# Patient Record
Sex: MALE | Race: BLACK OR AFRICAN AMERICAN | NOT HISPANIC OR LATINO | Employment: OTHER | URBAN - METROPOLITAN AREA
[De-identification: names, ages, dates, MRNs, and addresses within clinical notes are randomized per-mention and may not be internally consistent; named-entity substitution may affect disease eponyms.]

---

## 2023-05-15 LAB
AMPHETAMINE (PRESENCE) IN URINE BY SCREEN METHOD: ABNORMAL
BARBITURATES PRESENCE IN URINE BY SCREEN METHOD: ABNORMAL
BENZODIAZEPINE (PRESENCE) IN URINE BY SCREEN METHOD: ABNORMAL
CANNABINOIDS IN URINE BY SCREEN METHOD: ABNORMAL
CARBAMAZEPINE (UG/ML) IN SER/PLAS: 5.1 UG/ML (ref 4–12)
COCAINE (PRESENCE) IN URINE BY SCREEN METHOD: ABNORMAL
DRUG SCREEN COMMENT URINE: ABNORMAL
FENTANYL URINE: ABNORMAL
KEPPRA: 17 UG/ML (ref 10–40)
METHADONE (PRESENCE) IN URINE BY SCREEN METHOD: ABNORMAL
OPIATES (PRESENCE) IN URINE BY SCREEN METHOD: ABNORMAL
OXYCODONE (PRESENCE) IN URINE BY SCREEN METHOD: ABNORMAL
PHENCYCLIDINE (PRESENCE) IN URINE BY SCREEN METHOD: ABNORMAL
PHENOBARBITAL (UG/ML) IN SER/PLAS: 30.7 UG/ML (ref 10–40)
TOPIRAMATE LEVEL: 6.1 UG/ML (ref 2–25)

## 2023-05-22 LAB
BUTALBITAL, URN, QUANT: <50 NG/ML
PENTOBARBITAL, URN, QUANT: <50 NG/ML
PHENOBARBITAL, URN, QUANT: >5000 NG/ML

## 2023-10-23 ENCOUNTER — ANCILLARY PROCEDURE (OUTPATIENT)
Dept: RADIOLOGY | Facility: CLINIC | Age: 58
End: 2023-10-23
Payer: MEDICARE

## 2023-10-23 DIAGNOSIS — G40.209 LOCALIZATION-RELATED (FOCAL) (PARTIAL) SYMPTOMATIC EPILEPSY AND EPILEPTIC SYNDROMES WITH COMPLEX PARTIAL SEIZURES, NOT INTRACTABLE, WITHOUT STATUS EPILEPTICUS (MULTI): ICD-10-CM

## 2023-10-23 PROCEDURE — 70450 CT HEAD/BRAIN W/O DYE: CPT | Performed by: STUDENT IN AN ORGANIZED HEALTH CARE EDUCATION/TRAINING PROGRAM

## 2023-10-23 PROCEDURE — 70450 CT HEAD/BRAIN W/O DYE: CPT

## 2023-11-08 PROBLEM — G40.219 PARTIAL SYMPTOMATIC EPILEPSY WITH COMPLEX PARTIAL SEIZURES, INTRACTABLE, WITHOUT STATUS EPILEPTICUS (MULTI): Status: ACTIVE | Noted: 2023-11-08

## 2023-11-08 PROBLEM — E03.9 HYPOTHYROIDISM: Status: ACTIVE | Noted: 2023-11-08

## 2023-11-08 PROBLEM — Z83.719 FAMILY HISTORY OF COLONIC POLYPS: Status: ACTIVE | Noted: 2023-11-08

## 2023-11-08 PROBLEM — E66.811 OBESITY, CLASS I, BMI 30-34.9: Status: ACTIVE | Noted: 2018-04-27

## 2023-11-08 PROBLEM — D49.6 BRAIN TUMOR (MULTI): Status: ACTIVE | Noted: 2023-11-08

## 2023-11-08 PROBLEM — E66.9 OBESITY, CLASS I, BMI 30-34.9: Status: ACTIVE | Noted: 2018-04-27

## 2023-11-08 PROBLEM — E55.9 VITAMIN D DEFICIENCY: Status: ACTIVE | Noted: 2017-12-29

## 2023-11-08 PROBLEM — L13.9 BULLOUS DERMATOSIS: Status: ACTIVE | Noted: 2020-07-29

## 2023-11-08 PROBLEM — R41.82 AMS (ALTERED MENTAL STATUS): Status: ACTIVE | Noted: 2020-02-13

## 2023-11-08 PROBLEM — G40.209 FOCAL EPILEPSY WITH IMPAIRMENT OF CONSCIOUSNESS (MULTI): Status: ACTIVE | Noted: 2020-07-29

## 2023-11-08 RX ORDER — HYDROCORTISONE 5 MG/1
TABLET ORAL
COMMUNITY
Start: 2021-02-12

## 2023-11-08 RX ORDER — ATORVASTATIN CALCIUM 20 MG/1
TABLET, FILM COATED ORAL
COMMUNITY
Start: 2023-05-15

## 2023-11-08 RX ORDER — WARFARIN SODIUM 5 MG/1
1 TABLET ORAL DAILY
COMMUNITY
Start: 2021-02-12

## 2023-11-08 RX ORDER — CARVEDILOL 6.25 MG/1
TABLET ORAL
COMMUNITY
Start: 2023-05-16

## 2023-11-08 RX ORDER — BENAZEPRIL HYDROCHLORIDE 40 MG/1
TABLET ORAL
COMMUNITY
Start: 2023-05-15

## 2023-11-08 RX ORDER — ARTIFICIAL TEARS 1; 2; 3 MG/ML; MG/ML; MG/ML
SOLUTION/ DROPS OPHTHALMIC
COMMUNITY
Start: 2023-10-05

## 2023-11-08 RX ORDER — B-COMPLEX WITH VITAMIN C
TABLET ORAL
COMMUNITY
Start: 2023-10-05

## 2023-11-08 RX ORDER — HYDROCORTISONE 10 MG/1
TABLET ORAL
COMMUNITY
Start: 2021-02-12

## 2023-11-08 RX ORDER — TOPIRAMATE 100 MG/1
1 TABLET, FILM COATED ORAL 3 TIMES DAILY
COMMUNITY
Start: 2018-04-20 | End: 2024-04-29

## 2023-11-08 RX ORDER — ACETAMINOPHEN 325 MG/1
TABLET ORAL
COMMUNITY
Start: 2023-06-19

## 2023-11-08 RX ORDER — CARBAMAZEPINE 300 MG/1
300 CAPSULE, EXTENDED RELEASE ORAL 2 TIMES DAILY
COMMUNITY
Start: 2020-01-29 | End: 2024-04-29

## 2023-11-08 RX ORDER — LEVETIRACETAM 500 MG/1
1 TABLET ORAL 3 TIMES DAILY
COMMUNITY
Start: 2018-04-20 | End: 2024-05-09 | Stop reason: WASHOUT

## 2023-11-08 RX ORDER — PHENOBARBITAL 97.2 MG/1
97.2 TABLET ORAL 2 TIMES DAILY
COMMUNITY
Start: 2019-03-26 | End: 2023-12-05

## 2023-11-08 RX ORDER — DESMOPRESSIN ACETATE 0.2 MG/1
TABLET ORAL
COMMUNITY
Start: 2021-02-12

## 2023-11-08 RX ORDER — LEVOTHYROXINE SODIUM 200 UG/1
TABLET ORAL
COMMUNITY
Start: 2021-02-12

## 2023-11-08 RX ORDER — LEVETIRACETAM 500 MG/1
500 TABLET ORAL 2 TIMES DAILY
COMMUNITY
Start: 2020-02-16 | End: 2024-05-09 | Stop reason: WASHOUT

## 2023-11-08 RX ORDER — HYDROCHLOROTHIAZIDE 25 MG/1
TABLET ORAL
COMMUNITY
Start: 2023-05-15

## 2023-11-08 RX ORDER — ERGOCALCIFEROL 1.25 MG/1
CAPSULE ORAL
COMMUNITY
Start: 2023-05-15

## 2023-11-09 ENCOUNTER — APPOINTMENT (OUTPATIENT)
Dept: NEUROLOGY | Facility: CLINIC | Age: 58
End: 2023-11-09
Payer: MEDICARE

## 2023-11-09 ENCOUNTER — OFFICE VISIT (OUTPATIENT)
Dept: NEUROLOGY | Facility: CLINIC | Age: 58
End: 2023-11-09
Payer: MEDICARE

## 2023-11-09 VITALS
WEIGHT: 229 LBS | HEART RATE: 59 BPM | HEIGHT: 67 IN | DIASTOLIC BLOOD PRESSURE: 69 MMHG | BODY MASS INDEX: 35.94 KG/M2 | TEMPERATURE: 96.9 F | SYSTOLIC BLOOD PRESSURE: 164 MMHG

## 2023-11-09 DIAGNOSIS — G40.219 PARTIAL SYMPTOMATIC EPILEPSY WITH COMPLEX PARTIAL SEIZURES, INTRACTABLE, WITHOUT STATUS EPILEPTICUS (MULTI): Primary | ICD-10-CM

## 2023-11-09 PROCEDURE — 99213 OFFICE O/P EST LOW 20 MIN: CPT | Performed by: PSYCHIATRY & NEUROLOGY

## 2023-11-09 PROCEDURE — 1036F TOBACCO NON-USER: CPT | Performed by: PSYCHIATRY & NEUROLOGY

## 2023-11-09 PROCEDURE — 3078F DIAST BP <80 MM HG: CPT | Performed by: PSYCHIATRY & NEUROLOGY

## 2023-11-09 PROCEDURE — 3077F SYST BP >= 140 MM HG: CPT | Performed by: PSYCHIATRY & NEUROLOGY

## 2023-11-09 NOTE — PROGRESS NOTES
Subjective     Tristan Cueto is a 58 y.o. year old male here for follow-up for seizures.  He has had no seizures in the last few weeks since his prior visit.  He is tolerating the new dose of levetiracetam at 1 g bid.  He remains on carbamazepin  mg bid, phenobarbital 97.2 mg x 2 at bedtime, and topiramate 100 mg tid.        Patient Active Problem List   Diagnosis    Embolism and thrombosis (CMS/HCC)    AMS (altered mental status)    Brain tumor (CMS/HCC)    Bullous dermatosis    Diabetes insipidus (CMS/HCC)    Family history of colonic polyps    Focal epilepsy with impairment of consciousness (CMS/HCC)    Hyperlipidemia    Hypothyroidism    Obesity, Class I, BMI 30-34.9    Panhypopituitarism (CMS/HCC)    Seizure disorder (CMS/HCC)    Vitamin D deficiency    HTN (hypertension)    Partial symptomatic epilepsy with complex partial seizures, intractable, without status epilepticus (CMS/HCC)     Past Medical History:   Diagnosis Date    Personal history of other specified conditions     History of seizure     No past surgical history on file.  Social History     Tobacco Use    Smoking status: Never    Smokeless tobacco: Never   Substance Use Topics    Alcohol use: Never     family history includes No Known Problems in his father.    Current Outpatient Medications:     acetaminophen (Tylenol) 325 mg tablet, , Disp: , Rfl:     apixaban (Eliquis) 5 mg tablet, Take 1 tablet (5 mg) by mouth twice a day., Disp: , Rfl:     artificial tears, dextran-hypomel-glycerin, 0.1-0.3-0.2 % ophthalmic solution, INSTILL 1 DROP INTO EACH EYE THREE TIMES DAILY (MAY APPLY HIS OWN DROPS, SELF MED FOR EYE DROPS ONLY) (8AM,4PM,8PM) (50 DAY SUPPLY), Disp: , Rfl:     atorvastatin (Lipitor) 20 mg tablet, TAKE 1 TABLET BY MOUTH AT BEDTIME FOR HIGH CHOLESTEROL (8PM), Disp: , Rfl:     b complex-vitamin c tablet, TAKE 1 TABLET BY MOUTH ONCE EVERY DAY FOR SUPPLEMENT (8AM), Disp: , Rfl:     benazepril (Lotensin) 40 mg tablet, TAKE 1 TABLET BY  "MOUTH ONCE EVERY DAY FOR HYPERTENSION (8AM), Disp: , Rfl:     carBAMazepine (Carbatrol) 300 mg 12 hr capsule, Take 1 capsule (300 mg) by mouth twice a day., Disp: , Rfl:     carvedilol (Coreg) 6.25 mg tablet, TAKE 2 TABLETS (12.5MG) BY MOUTH EVERY 12 HOURS FOR HIGH BLOOD PRESSURE (8AM,8PM), Disp: , Rfl:     desmopressin (DDAVP) 0.2 mg tablet, TAKE 1 TABLET BY MOUTH AT BEDTIME (8PM), Disp: , Rfl:     ergocalciferol (Vitamin D-2) 1.25 MG (47350 UT) capsule, TAKE 1 CAPSULE BY MOUTH ONCE WEEKLY ON FRIDAY (8AM), Disp: , Rfl:     hydroCHLOROthiazide (HYDRODiuril) 25 mg tablet, TAKE 1 TABLET BY MOUTH ONCE EVERY DAY (8AM), Disp: , Rfl:     hydrocortisone (Cortef) 10 mg tablet, TAKE 1 & 1/2 TABLETS (15MG) BY MOUTH EVERY MORNING (8AM);TAKE 1 TABLET BY MOUTH DAILY AT 4PM, Disp: , Rfl:     hydrocortisone (Cortef) 5 mg tablet, Take by mouth., Disp: , Rfl:     levETIRAcetam (Keppra) 500 mg tablet, Take 1 tablet (500 mg) by mouth twice a day., Disp: , Rfl:     levETIRAcetam (Keppra) 500 mg tablet, Take 1 tablet (500 mg) by mouth 3 times a day., Disp: , Rfl:     levothyroxine (Synthroid, Levoxyl) 200 mcg tablet, TAKE 1 TABLET BY MOUTH ONCE EVERY DAY FOR HYPOTHYROIDISM (TAKE 30 MINUTES BEFORE BREAKFAST OR OTHER MEDICATIONS) (4PM), Disp: , Rfl:     PHENobarbitaL 97.2 mg tablet, Take 1 tablet (97.2 mg) by mouth twice a day., Disp: , Rfl:     topiramate (Topamax) 100 mg tablet, Take 1 tablet (100 mg) by mouth 3 times a day., Disp: , Rfl:     warfarin (Coumadin) 5 mg tablet, Take 1 tablet (5 mg) by mouth once daily., Disp: , Rfl:   Allergies   Allergen Reactions    Ibuprofen Hives and Rash    Aspirin Unknown    Salicylates Unknown     Patient states he gets dizzy    Codeine Rash and Unknown       Objective     /69   Pulse 59   Temp 36.1 °C (96.9 °F)   Ht 1.702 m (5' 7\")   Wt 104 kg (229 lb)   BMI 35.87 kg/m²     CONSTITUTIONAL:  No acute distress    MENTAL STATUS:  Awake, alert, fully oriented to self, place, and " time.    SPEECH AND LANGUAGE:  Can name and repeat, follows all commands, has no dysarthria    CRANIAL NERVES:  II-Vision present, visual fields full to confrontational testing    III/IV/VI--EOMs are present in all directions.  Pupils are symmetrically reactive in dim light.  No ptosis.    V--Normal facial sensation.    VII--No facial asymmetry.    VIII--Hearing present to voice bilaterally.    IX/X--Symmetric soft palate rise.    XI--Normal trapezius power bilaterally.    XII--Tongue protrudes without deviation.    MOTOR:  Normal power, tone, and bulk in both arms and both legs.    SENSORY:  Normal pin sensation in both arms and both legs without distal-proximal gradient, asymmetry, or spinal sensory level.    COORDINATION:  Normal finger-to-nose and heel-to-shin testing in both arms and both legs.    REFLEXES are normal and symmetric at the biceps, triceps, brachioradialis, patella, and ankle.  The plantar responses are flexor.    GAIT is normal, without steppage, ataxia, shuffling, or spasticity.      Assessment/Plan   Diagnoses and all orders for this visit:  Partial symptomatic epilepsy with complex partial seizures, intractable, without status epilepticus (CMS/HCC)      IMPRESSION:  Complex partial seizures    PLAN:  Continue current antiepileptic regimen.  I will see him in six months or prn.    Rafael King Jr., M.D., FAAN

## 2023-12-04 DIAGNOSIS — G40.219 PARTIAL SYMPTOMATIC EPILEPSY WITH COMPLEX PARTIAL SEIZURES, INTRACTABLE, WITHOUT STATUS EPILEPTICUS (MULTI): Primary | ICD-10-CM

## 2023-12-05 RX ORDER — PHENOBARBITAL 97.2 MG/1
TABLET ORAL
Qty: 60 TABLET | Refills: 5 | Status: SHIPPED | OUTPATIENT
Start: 2023-12-05 | End: 2024-05-10 | Stop reason: SDUPTHER

## 2024-01-16 DIAGNOSIS — G40.219 PARTIAL SYMPTOMATIC EPILEPSY WITH COMPLEX PARTIAL SEIZURES, INTRACTABLE, WITHOUT STATUS EPILEPTICUS (MULTI): Primary | ICD-10-CM

## 2024-01-18 RX ORDER — LEVETIRACETAM 1000 MG/1
1000 TABLET ORAL 2 TIMES DAILY
Qty: 60 TABLET | Refills: 5 | Status: SHIPPED | OUTPATIENT
Start: 2024-01-18 | End: 2024-06-10

## 2024-04-26 DIAGNOSIS — G40.219 PARTIAL SYMPTOMATIC EPILEPSY WITH COMPLEX PARTIAL SEIZURES, INTRACTABLE, WITHOUT STATUS EPILEPTICUS (MULTI): Primary | ICD-10-CM

## 2024-04-29 RX ORDER — CARBAMAZEPINE 300 MG/1
CAPSULE, EXTENDED RELEASE ORAL
Qty: 62 CAPSULE | Refills: 11 | Status: SHIPPED | OUTPATIENT
Start: 2024-04-29 | End: 2024-06-10

## 2024-04-29 RX ORDER — TOPIRAMATE 100 MG/1
TABLET, FILM COATED ORAL
Qty: 93 TABLET | Refills: 11 | Status: SHIPPED | OUTPATIENT
Start: 2024-04-29 | End: 2024-06-10

## 2024-05-09 ENCOUNTER — LAB (OUTPATIENT)
Dept: LAB | Facility: LAB | Age: 59
End: 2024-05-09
Payer: MEDICARE

## 2024-05-09 ENCOUNTER — OFFICE VISIT (OUTPATIENT)
Dept: NEUROLOGY | Facility: CLINIC | Age: 59
End: 2024-05-09
Payer: MEDICARE

## 2024-05-09 VITALS
WEIGHT: 231 LBS | SYSTOLIC BLOOD PRESSURE: 154 MMHG | DIASTOLIC BLOOD PRESSURE: 71 MMHG | HEIGHT: 67 IN | HEART RATE: 73 BPM | BODY MASS INDEX: 36.26 KG/M2 | TEMPERATURE: 96.9 F

## 2024-05-09 DIAGNOSIS — G40.219 PARTIAL SYMPTOMATIC EPILEPSY WITH COMPLEX PARTIAL SEIZURES, INTRACTABLE, WITHOUT STATUS EPILEPTICUS (MULTI): Primary | ICD-10-CM

## 2024-05-09 DIAGNOSIS — Z79.899 CONTROLLED SUBSTANCE AGREEMENT SIGNED: ICD-10-CM

## 2024-05-09 DIAGNOSIS — G44.201 ACUTE INTRACTABLE TENSION-TYPE HEADACHE: ICD-10-CM

## 2024-05-09 LAB
AMPHETAMINES UR QL SCN: ABNORMAL
BARBITURATES UR QL SCN: ABNORMAL
BENZODIAZ UR QL SCN: ABNORMAL
BZE UR QL SCN: ABNORMAL
CANNABINOIDS UR QL SCN: ABNORMAL
FENTANYL+NORFENTANYL UR QL SCN: ABNORMAL
METHADONE UR QL SCN: ABNORMAL
OPIATES UR QL SCN: ABNORMAL
OXYCODONE+OXYMORPHONE UR QL SCN: ABNORMAL
PCP UR QL SCN: ABNORMAL

## 2024-05-09 PROCEDURE — 99213 OFFICE O/P EST LOW 20 MIN: CPT | Performed by: PSYCHIATRY & NEUROLOGY

## 2024-05-09 PROCEDURE — 80345 DRUG SCREENING BARBITURATES: CPT

## 2024-05-09 PROCEDURE — 1036F TOBACCO NON-USER: CPT | Performed by: PSYCHIATRY & NEUROLOGY

## 2024-05-09 PROCEDURE — 80307 DRUG TEST PRSMV CHEM ANLYZR: CPT

## 2024-05-09 PROCEDURE — 3078F DIAST BP <80 MM HG: CPT | Performed by: PSYCHIATRY & NEUROLOGY

## 2024-05-09 PROCEDURE — 3077F SYST BP >= 140 MM HG: CPT | Performed by: PSYCHIATRY & NEUROLOGY

## 2024-05-09 NOTE — PROGRESS NOTES
NEUROLOGY OUTPATIENT FOLLOW-UP NOTE    Assessment/Plan   Diagnoses and all orders for this visit:  Partial symptomatic epilepsy with complex partial seizures, intractable, without status epilepticus (Multi)  Acute intractable tension-type headache  -     CT head wo IV contrast; Future  Controlled substance agreement signed  -     Drug Screen, Urine With Reflex to Confirmation; Future      IMPRESSION:  Complex partial seizures, controlled.  New headache syndrome.    PLAN:  Continue antiepileptic regimen of Carbatrol 300 mg bid, levetiracetam 1000 mg bid, phenobarbital 2 x 97.2 mg at bedtime, and topiramate 100 mg tid.  Cranial CT to rule out intracranial pathology causing new headaches.  Given only intermittent use of acetaminophen for the headaches, he can continue that; he doesn't require additional headache prophylaxis at this time.  I will see him again in six months or prn.      Rafael King Jr., M.D., FAAN   ----------    I have personally reviewed the [place] report for Tristan Cueto   I have considered the risks of abuse, dependence, addiction and diversion.   Controlled Substance Agreement:   I have printed this form and reviewed each line item with the patient and the patient has verbalized understanding.   Date of the last Controlled Substance Agreement:  5/9/2024  Date of last urine toxicology screen:  5/9/2024    Subjective     Tristan Cueto is a 58 y.o. year old male here for follow-up.    No seizures.  He has had about two months of bifrontal pressure headaches, typically once a week, and they improve with acetaminophen.  He denies sinus symptoms.    He denies other focal neurological symptoms including dysarthria, dysphagia, diplopia, focal weakness, focal sensory change, ataxia, vertigo, or bowel/bladder incontinence, among others.      Past Medical History:   Diagnosis Date    Personal history of other specified conditions     History of seizure     No past surgical history on  file.  Social History     Tobacco Use    Smoking status: Never    Smokeless tobacco: Never   Substance Use Topics    Alcohol use: Never     family history includes No Known Problems in his father.    Current Outpatient Medications:     PHENobarbitaL 97.2 mg tablet, (CONTROL CYCLE) TAKE 2 TABLETS (194.4MG) BY MOUTH AT BEDTIME  (8PM), Disp: 60 tablet, Rfl: 5    acetaminophen (Tylenol) 325 mg tablet, , Disp: , Rfl:     apixaban (Eliquis) 5 mg tablet, Take 1 tablet (5 mg) by mouth twice a day., Disp: , Rfl:     artificial tears, dextran-hypomel-glycerin, 0.1-0.3-0.2 % ophthalmic solution, INSTILL 1 DROP INTO EACH EYE THREE TIMES DAILY (MAY APPLY HIS OWN DROPS, SELF MED FOR EYE DROPS ONLY) (8AM,4PM,8PM) (50 DAY SUPPLY), Disp: , Rfl:     atorvastatin (Lipitor) 20 mg tablet, TAKE 1 TABLET BY MOUTH AT BEDTIME FOR HIGH CHOLESTEROL (8PM), Disp: , Rfl:     b complex-vitamin c tablet, TAKE 1 TABLET BY MOUTH ONCE EVERY DAY FOR SUPPLEMENT (8AM), Disp: , Rfl:     benazepril (Lotensin) 40 mg tablet, TAKE 1 TABLET BY MOUTH ONCE EVERY DAY FOR HYPERTENSION (8AM), Disp: , Rfl:     carBAMazepine (Carbatrol) 300 mg 12 hr capsule, TAKE 1 CAPSULE BY MOUTH TWICE DAILY FOR SEIZURES (8AM,8PM), Disp: 62 capsule, Rfl: 11    carvedilol (Coreg) 6.25 mg tablet, TAKE 2 TABLETS (12.5MG) BY MOUTH EVERY 12 HOURS FOR HIGH BLOOD PRESSURE (8AM,8PM), Disp: , Rfl:     desmopressin (DDAVP) 0.2 mg tablet, TAKE 1 TABLET BY MOUTH AT BEDTIME (8PM), Disp: , Rfl:     ergocalciferol (Vitamin D-2) 1.25 MG (71169 UT) capsule, TAKE 1 CAPSULE BY MOUTH ONCE WEEKLY ON FRIDAY (8AM), Disp: , Rfl:     hydroCHLOROthiazide (HYDRODiuril) 25 mg tablet, TAKE 1 TABLET BY MOUTH ONCE EVERY DAY (8AM), Disp: , Rfl:     hydrocortisone (Cortef) 10 mg tablet, TAKE 1 & 1/2 TABLETS (15MG) BY MOUTH EVERY MORNING (8AM);TAKE 1 TABLET BY MOUTH DAILY AT 4PM, Disp: , Rfl:     hydrocortisone (Cortef) 5 mg tablet, Take by mouth., Disp: , Rfl:     levETIRAcetam (Keppra) 1,000 mg tablet, Take 1  "tablet (1,000 mg) by mouth 2 times a day., Disp: 60 tablet, Rfl: 5    levETIRAcetam (Keppra) 500 mg tablet, Take 1 tablet (500 mg) by mouth twice a day., Disp: , Rfl:     levETIRAcetam (Keppra) 500 mg tablet, Take 1 tablet (500 mg) by mouth 3 times a day., Disp: , Rfl:     levothyroxine (Synthroid, Levoxyl) 200 mcg tablet, TAKE 1 TABLET BY MOUTH ONCE EVERY DAY FOR HYPOTHYROIDISM (TAKE 30 MINUTES BEFORE BREAKFAST OR OTHER MEDICATIONS) (4PM), Disp: , Rfl:     topiramate (Topamax) 100 mg tablet, TAKE 1 TABLET BY MOUTH THREE TIMES DAILY FOR SEIZURES (8AM,4PM,8PM), Disp: 93 tablet, Rfl: 11    warfarin (Coumadin) 5 mg tablet, Take 1 tablet (5 mg) by mouth once daily., Disp: , Rfl:   Allergies   Allergen Reactions    Ibuprofen Hives and Rash    Aspirin Unknown    Salicylates Unknown     Patient states he gets dizzy    Codeine Rash and Unknown       Objective     /71   Pulse 73   Temp 36.1 °C (96.9 °F)   Ht 1.702 m (5' 7\")   Wt 105 kg (231 lb)   BMI 36.18 kg/m²     CONSTITUTIONAL:  No acute distress    HEENT:  No forehead or maxillary tenderness.    MENTAL STATUS:  Awake, alert, fully oriented to self, place, and time, with present short-term memory, good awareness of recent events, normal attention span, concentration, and fund of knowledge.    SPEECH AND LANGUAGE:  Can name and repeat, follows all commands, has no dysarthria    CRANIAL NERVES:  II-Vision present, visual fields full to confrontational testing    III/IV/VI--EOMs are present in all directions.  Minor left ptosis, right eye exotropia. Pupils are symmetrically reactive in dim light.      V--Normal facial sensation.    VII--No facial asymmetry.    VIII--Hearing present to voice bilaterally.    IX/X--Symmetric soft palate rise.    XI--Normal trapezius power bilaterally.    XII--Tongue protrudes without deviation.    MOTOR:  Normal power, tone, and bulk in both arms and both legs.    SENSORY:  Normal pin sensation in both arms and both legs without " distal-proximal gradient, asymmetry, or spinal sensory level.    COORDINATION:  Normal finger-to-nose and heel-to-shin testing in both arms and both legs.    REFLEXES are normal and symmetric at the biceps, triceps, brachioradialis, patella, and ankle.  The plantar responses are flexor.    GAIT is normal, without steppage, ataxia, shuffling, or spasticity.      Rafael King Jr., M.D., FAAN

## 2024-05-10 DIAGNOSIS — G40.219 PARTIAL SYMPTOMATIC EPILEPSY WITH COMPLEX PARTIAL SEIZURES, INTRACTABLE, WITHOUT STATUS EPILEPTICUS (MULTI): ICD-10-CM

## 2024-05-10 RX ORDER — PHENOBARBITAL 97.2 MG/1
194.4 TABLET ORAL NIGHTLY
Qty: 60 TABLET | Refills: 5 | Status: SHIPPED | OUTPATIENT
Start: 2024-05-10 | End: 2024-06-10

## 2024-05-15 RX ORDER — LOPERAMIDE HYDROCHLORIDE 2 MG/1
2 CAPSULE ORAL 4 TIMES DAILY PRN
COMMUNITY

## 2024-05-23 ENCOUNTER — HOSPITAL ENCOUNTER (OUTPATIENT)
Dept: RADIOLOGY | Facility: CLINIC | Age: 59
Discharge: HOME | End: 2024-05-23
Payer: MEDICARE

## 2024-05-23 DIAGNOSIS — G44.201 ACUTE INTRACTABLE TENSION-TYPE HEADACHE: ICD-10-CM

## 2024-05-23 PROCEDURE — 70450 CT HEAD/BRAIN W/O DYE: CPT

## 2024-05-23 PROCEDURE — 70450 CT HEAD/BRAIN W/O DYE: CPT | Performed by: RADIOLOGY

## 2024-06-05 DIAGNOSIS — G40.219 PARTIAL SYMPTOMATIC EPILEPSY WITH COMPLEX PARTIAL SEIZURES, INTRACTABLE, WITHOUT STATUS EPILEPTICUS (MULTI): ICD-10-CM

## 2024-06-10 RX ORDER — TOPIRAMATE 100 MG/1
100 TABLET, FILM COATED ORAL 3 TIMES DAILY
Qty: 93 TABLET | Refills: 11 | Status: SHIPPED | OUTPATIENT
Start: 2024-06-10

## 2024-06-10 RX ORDER — CARBAMAZEPINE 300 MG/1
300 CAPSULE, EXTENDED RELEASE ORAL 2 TIMES DAILY
Qty: 62 CAPSULE | Refills: 11 | Status: SHIPPED | OUTPATIENT
Start: 2024-06-10

## 2024-06-10 RX ORDER — PHENOBARBITAL 97.2 MG/1
194.4 TABLET ORAL NIGHTLY
Qty: 62 TABLET | Refills: 5 | Status: SHIPPED | OUTPATIENT
Start: 2024-06-10

## 2024-06-10 RX ORDER — LEVETIRACETAM 1000 MG/1
1000 TABLET ORAL 2 TIMES DAILY
Qty: 62 TABLET | Refills: 11 | Status: SHIPPED | OUTPATIENT
Start: 2024-06-10

## 2024-11-18 ENCOUNTER — APPOINTMENT (OUTPATIENT)
Dept: NEUROLOGY | Facility: CLINIC | Age: 59
End: 2024-11-18
Payer: MEDICARE

## 2024-11-18 VITALS
HEART RATE: 57 BPM | DIASTOLIC BLOOD PRESSURE: 78 MMHG | SYSTOLIC BLOOD PRESSURE: 146 MMHG | TEMPERATURE: 96.8 F | BODY MASS INDEX: 35.16 KG/M2 | WEIGHT: 224 LBS | HEIGHT: 67 IN

## 2024-11-18 DIAGNOSIS — G40.219 PARTIAL SYMPTOMATIC EPILEPSY WITH COMPLEX PARTIAL SEIZURES, INTRACTABLE, WITHOUT STATUS EPILEPTICUS (MULTI): Primary | ICD-10-CM

## 2024-11-18 PROCEDURE — 3078F DIAST BP <80 MM HG: CPT | Performed by: PSYCHIATRY & NEUROLOGY

## 2024-11-18 PROCEDURE — 3008F BODY MASS INDEX DOCD: CPT | Performed by: PSYCHIATRY & NEUROLOGY

## 2024-11-18 PROCEDURE — 1036F TOBACCO NON-USER: CPT | Performed by: PSYCHIATRY & NEUROLOGY

## 2024-11-18 PROCEDURE — 99213 OFFICE O/P EST LOW 20 MIN: CPT | Performed by: PSYCHIATRY & NEUROLOGY

## 2024-11-18 PROCEDURE — 3077F SYST BP >= 140 MM HG: CPT | Performed by: PSYCHIATRY & NEUROLOGY

## 2024-11-18 RX ORDER — PHENOBARBITAL 97.2 MG/1
194.4 TABLET ORAL NIGHTLY
Qty: 62 TABLET | Refills: 5 | Status: SHIPPED | OUTPATIENT
Start: 2024-11-18

## 2024-11-18 NOTE — PROGRESS NOTES
NEUROLOGY OUTPATIENT FOLLOW-UP NOTE    Assessment/Plan   Diagnoses and all orders for this visit:  Partial symptomatic epilepsy with complex partial seizures, intractable, without status epilepticus (Multi)  -     PHENobarbitaL 97.2 mg tablet; Take 2 tablets (194.4 mg) by mouth once daily at bedtime.      IMPRESSION:  Complex partial seizures, controlled.      PLAN:  Continue antiepileptic regimen of Carbatrol 300 mg bid, levetiracetam 1000 mg bid, phenobarbital 2 x 97.2 mg at bedtime, and topiramate 100 mg tid.   I will see him again in six months or prn.      Rafael King Jr., M.D., FAAN   ----------    Subjective     Tristan Cueto is a 59 y.o. year old male here for follow-up.    No seizures.  Rare headaches.  He denies new focal neurological symptoms including dysarthria, dysphagia, diplopia, focal weakness, focal sensory change, ataxia, vertigo, or bowel/bladder incontinence, among others.      Past Medical History:   Diagnosis Date    Personal history of other specified conditions     History of seizure     No past surgical history on file.  Social History     Tobacco Use    Smoking status: Never    Smokeless tobacco: Never   Substance Use Topics    Alcohol use: Never     family history includes No Known Problems in his father.    Current Outpatient Medications:     acetaminophen (Tylenol) 325 mg tablet, , Disp: , Rfl:     apixaban (Eliquis) 5 mg tablet, Take 1 tablet (5 mg) by mouth twice a day., Disp: , Rfl:     artificial tears, dextran-hypomel-glycerin, 0.1-0.3-0.2 % ophthalmic solution, INSTILL 1 DROP INTO EACH EYE THREE TIMES DAILY (MAY APPLY HIS OWN DROPS, SELF MED FOR EYE DROPS ONLY) (8AM,4PM,8PM) (50 DAY SUPPLY), Disp: , Rfl:     atorvastatin (Lipitor) 20 mg tablet, TAKE 1 TABLET BY MOUTH AT BEDTIME FOR HIGH CHOLESTEROL (8PM), Disp: , Rfl:     b complex-vitamin c tablet, TAKE 1 TABLET BY MOUTH ONCE EVERY DAY FOR SUPPLEMENT (8AM), Disp: , Rfl:     benazepril (Lotensin) 40 mg tablet, TAKE 1  "TABLET BY MOUTH ONCE EVERY DAY FOR HYPERTENSION (8AM), Disp: , Rfl:     carBAMazepine (Carbatrol) 300 mg 12 hr capsule, TAKE 1 CAPSULE BY MOUTH TWICE DAILY, Disp: 62 capsule, Rfl: 11    carvedilol (Coreg) 6.25 mg tablet, TAKE 2 TABLETS (12.5MG) BY MOUTH EVERY 12 HOURS FOR HIGH BLOOD PRESSURE (8AM,8PM), Disp: , Rfl:     desmopressin (DDAVP) 0.2 mg tablet, TAKE 1 TABLET BY MOUTH AT BEDTIME (8PM), Disp: , Rfl:     ergocalciferol (Vitamin D-2) 1.25 MG (97417 UT) capsule, TAKE 1 CAPSULE BY MOUTH ONCE WEEKLY ON FRIDAY (8AM), Disp: , Rfl:     hydroCHLOROthiazide (HYDRODiuril) 25 mg tablet, TAKE 1 TABLET BY MOUTH ONCE EVERY DAY (8AM), Disp: , Rfl:     hydrocortisone (Cortef) 10 mg tablet, TAKE 1 & 1/2 TABLETS (15MG) BY MOUTH EVERY MORNING (8AM);TAKE 1 TABLET BY MOUTH DAILY AT 4PM, Disp: , Rfl:     hydrocortisone (Cortef) 5 mg tablet, Take by mouth., Disp: , Rfl:     levETIRAcetam (Keppra) 1,000 mg tablet, TAKE 1 TABLET BY MOUTH TWICE DAILY, Disp: 62 tablet, Rfl: 11    levothyroxine (Synthroid, Levoxyl) 200 mcg tablet, TAKE 1 TABLET BY MOUTH ONCE EVERY DAY FOR HYPOTHYROIDISM (TAKE 30 MINUTES BEFORE BREAKFAST OR OTHER MEDICATIONS) (4PM), Disp: , Rfl:     loperamide (Imodium) 2 mg capsule, Take 1 capsule (2 mg) by mouth 4 times a day as needed for diarrhea., Disp: , Rfl:     PHENobarbitaL 97.2 mg tablet, TAKE 2 TABLETS BY MOUTH EVERY NIGHT AT BEDTIME, Disp: 62 tablet, Rfl: 5    topiramate (Topamax) 100 mg tablet, TAKE 1 TABLET BY MOUTH 3 TIMES DAILY, Disp: 93 tablet, Rfl: 11    warfarin (Coumadin) 5 mg tablet, Take 1 tablet (5 mg) by mouth once daily. (Patient not taking: Reported on 11/18/2024), Disp: , Rfl:   Allergies   Allergen Reactions    Ibuprofen Hives and Rash    Aspirin Unknown    Salicylates Unknown     Patient states he gets dizzy    Codeine Rash and Unknown       Objective     /78   Pulse 57   Temp 36 °C (96.8 °F)   Ht 1.702 m (5' 7\")   Wt 102 kg (224 lb)   BMI 35.08 kg/m²     CONSTITUTIONAL:  No acute " distress    MENTAL STATUS:  Awake, alert, oriented to self, place, and time.  Baseline cognitive function    SPEECH AND LANGUAGE:  Can name and repeat, follows all commands, has no dysarthria    CRANIAL NERVES:  II-Vision present, visual fields full to confrontational testing    III/IV/VI--EOMs are present in all directions.  Pupils are symmetrically reactive in dim light.  No ptosis.    V--Normal facial sensation.    VII--No facial asymmetry.    VIII--Hearing present to voice bilaterally.    IX/X--Symmetric soft palate rise.    XI--Normal trapezius power bilaterally.    XII--Tongue protrudes without deviation.    MOTOR:  Normal power, tone, and bulk in both arms and both legs.    SENSORY:  Normal pin sensation in both arms and both legs without distal-proximal gradient, asymmetry, or spinal sensory level.    COORDINATION:  Normal finger-to-nose and heel-to-shin testing in both arms and both legs.    REFLEXES are normal and symmetric at the biceps, triceps, brachioradialis, patella, and ankle.  The plantar responses are flexor.    GAIT is normal, without steppage, ataxia, shuffling, or spasticity.      Rafael King Jr., M.D., FAAN

## 2024-12-03 DIAGNOSIS — G40.219 PARTIAL SYMPTOMATIC EPILEPSY WITH COMPLEX PARTIAL SEIZURES, INTRACTABLE, WITHOUT STATUS EPILEPTICUS (MULTI): ICD-10-CM

## 2024-12-03 RX ORDER — PHENOBARBITAL 97.2 MG/1
194.4 TABLET ORAL NIGHTLY
Qty: 62 TABLET | Refills: 5 | Status: SHIPPED | OUTPATIENT
Start: 2024-12-03

## 2025-02-06 ENCOUNTER — OFFICE VISIT (OUTPATIENT)
Dept: URGENT CARE | Age: 60
End: 2025-02-06
Payer: MEDICARE

## 2025-02-06 VITALS
RESPIRATION RATE: 16 BRPM | WEIGHT: 227.2 LBS | SYSTOLIC BLOOD PRESSURE: 148 MMHG | OXYGEN SATURATION: 99 % | TEMPERATURE: 98.1 F | HEART RATE: 60 BPM | BODY MASS INDEX: 35.66 KG/M2 | HEIGHT: 67 IN | DIASTOLIC BLOOD PRESSURE: 76 MMHG
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
## 2025-02-06 RX ORDER — AMOXICILLIN AND CLAVULANATE POTASSIUM 875; 125 MG/1; MG/1
1 TABLET, FILM COATED ORAL 2 TIMES DAILY
Qty: 14 TABLET | Refills: 0 | Status: SHIPPED | OUTPATIENT
Start: 2025-02-06 | End: 2025-02-13

## 2025-02-06 RX ORDER — PSEUDOEPH/DM/GUAIFEN/ACETAMIN 30-10-324
EXPECTORANT ORAL
COMMUNITY
Start: 2024-03-19

## 2025-02-06 NOTE — LETTER
February 6, 2025     Patient: Tristan Cueto   YOB: 1965   Date of Visit: 2/6/2025       To Whom It May Concern:    Tristan Cueto was seen in my clinic on 2/6/2025 at 2:30 pm. Please excuse Tristan for his absence from workshop until 02/17/2025.    If you have any questions or concerns, please don't hesitate to call.         Sincerely,           Kathy Jackson,         CC: No Recipients

## 2025-02-06 NOTE — LETTER
February 7, 2025     Patient: Tristan Cueto   YOB: 1965   Date of Visit: 2/6/2025       To Whom It May Concern:    Tristan Cueto was seen in my clinic on 2/6/2025 at 2:30 pm. Please excuse Tristan for his absence from school on this day to make the appointment. Tristan will take his medications prior to school and after school.  Tristan is cleared to return to workshop.    If you have any questions or concerns, please don't hesitate to call.         Sincerely,          Kathy Jackson,         CC: No Recipients
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
## 2025-05-13 DIAGNOSIS — G40.219 PARTIAL SYMPTOMATIC EPILEPSY WITH COMPLEX PARTIAL SEIZURES, INTRACTABLE, WITHOUT STATUS EPILEPTICUS: ICD-10-CM

## 2025-05-15 RX ORDER — PHENOBARBITAL 97.2 MG/1
194.4 TABLET ORAL NIGHTLY
Qty: 60 TABLET | Refills: 5 | Status: SHIPPED | OUTPATIENT
Start: 2025-05-15 | End: 2025-11-11

## 2025-05-19 ENCOUNTER — APPOINTMENT (OUTPATIENT)
Dept: NEUROLOGY | Facility: CLINIC | Age: 60
End: 2025-05-19
Payer: MEDICARE

## 2025-05-19 VITALS
TEMPERATURE: 96 F | BODY MASS INDEX: 35 KG/M2 | HEIGHT: 67 IN | HEART RATE: 54 BPM | SYSTOLIC BLOOD PRESSURE: 143 MMHG | DIASTOLIC BLOOD PRESSURE: 76 MMHG | WEIGHT: 223 LBS

## 2025-05-19 DIAGNOSIS — G40.219 PARTIAL SYMPTOMATIC EPILEPSY WITH COMPLEX PARTIAL SEIZURES, INTRACTABLE, WITHOUT STATUS EPILEPTICUS: Primary | ICD-10-CM

## 2025-05-19 DIAGNOSIS — Z79.899 CONTROLLED SUBSTANCE AGREEMENT SIGNED: ICD-10-CM

## 2025-05-19 PROCEDURE — 3077F SYST BP >= 140 MM HG: CPT | Performed by: PSYCHIATRY & NEUROLOGY

## 2025-05-19 PROCEDURE — 3008F BODY MASS INDEX DOCD: CPT | Performed by: PSYCHIATRY & NEUROLOGY

## 2025-05-19 PROCEDURE — 3078F DIAST BP <80 MM HG: CPT | Performed by: PSYCHIATRY & NEUROLOGY

## 2025-05-19 PROCEDURE — 99214 OFFICE O/P EST MOD 30 MIN: CPT | Performed by: PSYCHIATRY & NEUROLOGY

## 2025-05-19 PROCEDURE — 1036F TOBACCO NON-USER: CPT | Performed by: PSYCHIATRY & NEUROLOGY

## 2025-05-19 NOTE — PROGRESS NOTES
"NEUROLOGY OUTPATIENT FOLLOW-UP NOTE    Assessment/Plan   Diagnoses and all orders for this visit:  Partial symptomatic epilepsy with complex partial seizures, intractable, without status epilepticus  Controlled substance agreement signed  -     Drug Screen, Urine With Reflex to Confirmation; Future      IMPRESSION:  Complex partial seizures, controlled.  New headache syndrome.     PLAN:  Continue antiepileptic regimen of Carbatrol 300 mg bid, levetiracetam 1000 mg bid, phenobarbital 2 x 97.2 mg at bedtime, and topiramate 100 mg tid. I will see him again in six months or prn.      Rafael King Jr., M.D., FAAN   ----------    I have personally reviewed the [place] report for Tristan Cueto   I have considered the risks of abuse, dependence, addiction and diversion.   Controlled Substance Agreement:   I have printed this form and reviewed each line item with the patient and the patient has verbalized understanding.   Date of the last Controlled Substance Agreement:  5/19/2025  Date of last urine toxicology screen:  5/19/2025    Subjective     Tristan Cueto is a 59 y.o. year old male here for follow-up.    No seizures.  He denies other focal neurological symptoms including dysarthria, dysphagia, diplopia, focal weakness, focal sensory change, ataxia, vertigo, or bowel/bladder incontinence, among others.      Medical History[1]  Surgical History[2]  Social History     Tobacco Use    Smoking status: Never    Smokeless tobacco: Never   Substance Use Topics    Alcohol use: Never     family history includes No Known Problems in his father.  Current Medications[3]  Allergies[4]    Objective     /76   Pulse 54   Temp 35.6 °C (96 °F)   Ht 1.702 m (5' 7\")   Wt 101 kg (223 lb)   BMI 34.93 kg/m²     CONSTITUTIONAL:  No acute distress    MENTAL STATUS:  Awake, alert, fully oriented to self, place, and time.    SPEECH AND LANGUAGE:  Can name and repeat, follows all commands, has no dysarthria    CRANIAL " NERVES:  II-Vision present, visual fields full to confrontational testing    III/IV/VI--EOMs are present in all directions.  Minor left ptosis, right eye exotropia. Pupils are symmetrically reactive in dim light.     V--Normal facial sensation.    VII--No facial asymmetry.    VIII--Hearing present to voice bilaterally.    IX/X--Symmetric soft palate rise.    XI--Normal trapezius power bilaterally.    XII--Tongue protrudes without deviation.    MOTOR:  Normal power, tone, and bulk in both arms and both legs.    SENSORY:  Normal pin sensation in both arms and both legs without distal-proximal gradient, asymmetry, or spinal sensory level.    COORDINATION:  Normal finger-to-nose and heel-to-shin testing in both arms and both legs.    REFLEXES are normal and symmetric at the biceps, triceps, brachioradialis, patella, and ankle.  The plantar responses are flexor.    GAIT is normal, without steppage, ataxia, shuffling, or spasticity.      Rafael King Jr., M.D., FAAN         [1]   Past Medical History:  Diagnosis Date    Personal history of other specified conditions     History of seizure   [2] No past surgical history on file.  [3]   Current Outpatient Medications:     acetaminophen (Tylenol) 325 mg tablet, , Disp: , Rfl:     apixaban (Eliquis) 5 mg tablet, Take 1 tablet (5 mg) by mouth twice a day., Disp: , Rfl:     artificial tears, dextran-hypomel-glycerin, 0.1-0.3-0.2 % ophthalmic solution, INSTILL 1 DROP INTO EACH EYE THREE TIMES DAILY (MAY APPLY HIS OWN DROPS, SELF MED FOR EYE DROPS ONLY) (8AM,4PM,8PM) (50 DAY SUPPLY), Disp: , Rfl:     atorvastatin (Lipitor) 20 mg tablet, TAKE 1 TABLET BY MOUTH AT BEDTIME FOR HIGH CHOLESTEROL (8PM), Disp: , Rfl:     b complex-vitamin c tablet, TAKE 1 TABLET BY MOUTH ONCE EVERY DAY FOR SUPPLEMENT (8AM), Disp: , Rfl:     benazepril (Lotensin) 40 mg tablet, TAKE 1 TABLET BY MOUTH ONCE EVERY DAY FOR HYPERTENSION (8AM), Disp: , Rfl:     carBAMazepine (Carbatrol) 300 mg 12 hr  capsule, TAKE 1 CAPSULE BY MOUTH TWICE DAILY, Disp: 62 capsule, Rfl: 11    carvedilol (Coreg) 6.25 mg tablet, TAKE 2 TABLETS (12.5MG) BY MOUTH EVERY 12 HOURS FOR HIGH BLOOD PRESSURE (8AM,8PM), Disp: , Rfl:     desmopressin (DDAVP) 0.2 mg tablet, TAKE 1 TABLET BY MOUTH AT BEDTIME (8PM), Disp: , Rfl:     ergocalciferol (Vitamin D-2) 1.25 MG (76308 UT) capsule, TAKE 1 CAPSULE BY MOUTH ONCE WEEKLY ON FRIDAY (8AM), Disp: , Rfl:     hydroCHLOROthiazide (HYDRODiuril) 25 mg tablet, TAKE 1 TABLET BY MOUTH ONCE EVERY DAY (8AM), Disp: , Rfl:     hydrocortisone (Cortef) 10 mg tablet, TAKE 1 & 1/2 TABLETS (15MG) BY MOUTH EVERY MORNING (8AM);TAKE 1 TABLET BY MOUTH DAILY AT 4PM, Disp: , Rfl:     hydrocortisone (Cortef) 5 mg tablet, Take by mouth., Disp: , Rfl:     levETIRAcetam (Keppra) 1,000 mg tablet, TAKE 1 TABLET BY MOUTH TWICE DAILY, Disp: 62 tablet, Rfl: 11    levothyroxine (Synthroid, Levoxyl) 200 mcg tablet, TAKE 1 TABLET BY MOUTH ONCE EVERY DAY FOR HYPOTHYROIDISM (TAKE 30 MINUTES BEFORE BREAKFAST OR OTHER MEDICATIONS) (4PM), Disp: , Rfl:     loperamide (Imodium) 2 mg capsule, Take 1 capsule (2 mg) by mouth 4 times a day as needed for diarrhea., Disp: , Rfl:     PHENobarbital (Luminal) 97.2 mg tablet, Take 2 tablets (194.4 mg) by mouth once daily at bedtime., Disp: 60 tablet, Rfl: 5    topiramate (Topamax) 100 mg tablet, TAKE 1 TABLET BY MOUTH 3 TIMES DAILY, Disp: 93 tablet, Rfl: 11    Triple Antibiotic ointment, , Disp: , Rfl:   [4]   Allergies  Allergen Reactions    Ibuprofen Hives and Rash    Aspirin Unknown    Salicylates Unknown     Patient states he gets dizzy    Codeine Rash and Unknown

## 2025-05-20 LAB
AMOBARBITAL UR-MCNC: NEGATIVE NG/ML
AMPHETAMINES UR QL: NEGATIVE NG/ML
BARBITURATES UR QL: POSITIVE NG/ML
BENZODIAZ UR QL: NEGATIVE NG/ML
BUTALBITAL UR-MCNC: NEGATIVE NG/ML
BZE UR QL: NEGATIVE NG/ML
CREAT UR-MCNC: 48.2 MG/DL
DRUG SCREEN COMMENT UR-IMP: ABNORMAL
FENTANYL UR QL SCN: NEGATIVE NG/ML
METHADONE UR QL: NEGATIVE NG/ML
OPIATES UR QL: NEGATIVE NG/ML
OXIDANTS UR QL: NEGATIVE MCG/ML
OXYCODONE UR QL: NEGATIVE NG/ML
PCP UR QL: NEGATIVE NG/ML
PENTOBARB UR-MCNC: NEGATIVE NG/ML
PH UR: 4.9 [PH] (ref 4.5–9)
PHENOBARB UR-MCNC: >5000 NG/ML
QUEST NOTES AND COMMENTS: ABNORMAL
SECOBARBITAL UR-MCNC: NEGATIVE NG/ML
THC UR QL: NEGATIVE NG/ML

## 2025-06-17 DIAGNOSIS — G40.219 PARTIAL SYMPTOMATIC EPILEPSY WITH COMPLEX PARTIAL SEIZURES, INTRACTABLE, WITHOUT STATUS EPILEPTICUS: ICD-10-CM

## 2025-06-17 RX ORDER — CARBAMAZEPINE 300 MG/1
300 CAPSULE, EXTENDED RELEASE ORAL 2 TIMES DAILY
Qty: 62 CAPSULE | Refills: 11 | Status: SHIPPED | OUTPATIENT
Start: 2025-06-17

## 2025-06-17 RX ORDER — LEVETIRACETAM 1000 MG/1
1000 TABLET ORAL 2 TIMES DAILY
Qty: 62 TABLET | Refills: 11 | Status: SHIPPED | OUTPATIENT
Start: 2025-06-17

## 2025-06-17 RX ORDER — TOPIRAMATE 100 MG/1
100 TABLET, FILM COATED ORAL 3 TIMES DAILY
Qty: 93 TABLET | Refills: 11 | Status: SHIPPED | OUTPATIENT
Start: 2025-06-17

## 2025-11-17 ENCOUNTER — APPOINTMENT (OUTPATIENT)
Dept: NEUROLOGY | Facility: CLINIC | Age: 60
End: 2025-11-17
Payer: MEDICARE